# Patient Record
Sex: MALE | Race: BLACK OR AFRICAN AMERICAN | NOT HISPANIC OR LATINO | URBAN - METROPOLITAN AREA
[De-identification: names, ages, dates, MRNs, and addresses within clinical notes are randomized per-mention and may not be internally consistent; named-entity substitution may affect disease eponyms.]

---

## 2018-03-26 ENCOUNTER — EMERGENCY (EMERGENCY)
Facility: HOSPITAL | Age: 58
LOS: 1 days | Discharge: HOME | End: 2018-03-26
Attending: EMERGENCY MEDICINE | Admitting: EMERGENCY MEDICINE
Payer: COMMERCIAL

## 2018-03-26 VITALS
WEIGHT: 132.28 LBS | OXYGEN SATURATION: 93 % | TEMPERATURE: 97 F | RESPIRATION RATE: 22 BRPM | HEIGHT: 68 IN | DIASTOLIC BLOOD PRESSURE: 84 MMHG | HEART RATE: 106 BPM | SYSTOLIC BLOOD PRESSURE: 136 MMHG

## 2018-03-26 VITALS
DIASTOLIC BLOOD PRESSURE: 63 MMHG | TEMPERATURE: 98 F | SYSTOLIC BLOOD PRESSURE: 140 MMHG | RESPIRATION RATE: 20 BRPM | OXYGEN SATURATION: 93 % | HEART RATE: 97 BPM

## 2018-03-26 DIAGNOSIS — Z79.899 OTHER LONG TERM (CURRENT) DRUG THERAPY: ICD-10-CM

## 2018-03-26 DIAGNOSIS — R07.89 OTHER CHEST PAIN: ICD-10-CM

## 2018-03-26 LAB
ALBUMIN SERPL ELPH-MCNC: 4 G/DL — SIGNIFICANT CHANGE UP (ref 3.3–5)
ALP SERPL-CCNC: 77 U/L — SIGNIFICANT CHANGE UP (ref 40–120)
ALT FLD-CCNC: 27 U/L — SIGNIFICANT CHANGE UP (ref 10–45)
ANION GAP SERPL CALC-SCNC: 6 MMOL/L — SIGNIFICANT CHANGE UP (ref 5–17)
APPEARANCE UR: CLEAR — SIGNIFICANT CHANGE UP
APTT BLD: 31.6 SEC — SIGNIFICANT CHANGE UP (ref 27.5–37.4)
AST SERPL-CCNC: 29 U/L — SIGNIFICANT CHANGE UP (ref 10–40)
BASOPHILS NFR BLD AUTO: 0.4 % — SIGNIFICANT CHANGE UP (ref 0–2)
BILIRUB SERPL-MCNC: 0.3 MG/DL — SIGNIFICANT CHANGE UP (ref 0.2–1.2)
BILIRUB UR-MCNC: NEGATIVE — SIGNIFICANT CHANGE UP
BUN SERPL-MCNC: 14 MG/DL — SIGNIFICANT CHANGE UP (ref 7–23)
CALCIUM SERPL-MCNC: 9.5 MG/DL — SIGNIFICANT CHANGE UP (ref 8.4–10.5)
CHLORIDE SERPL-SCNC: 102 MMOL/L — SIGNIFICANT CHANGE UP (ref 96–108)
CK MB CFR SERPL CALC: 3.8 NG/ML — SIGNIFICANT CHANGE UP (ref 0–6.7)
CK SERPL-CCNC: 116 U/L — SIGNIFICANT CHANGE UP (ref 30–200)
CO2 SERPL-SCNC: 35 MMOL/L — HIGH (ref 22–31)
COLOR SPEC: YELLOW — SIGNIFICANT CHANGE UP
CREAT SERPL-MCNC: 1.2 MG/DL — SIGNIFICANT CHANGE UP (ref 0.5–1.3)
DIFF PNL FLD: NEGATIVE — SIGNIFICANT CHANGE UP
EOSINOPHIL NFR BLD AUTO: 4.2 % — SIGNIFICANT CHANGE UP (ref 0–6)
GLUCOSE SERPL-MCNC: 67 MG/DL — LOW (ref 70–99)
GLUCOSE UR QL: NEGATIVE — SIGNIFICANT CHANGE UP
HCT VFR BLD CALC: 43.5 % — SIGNIFICANT CHANGE UP (ref 39–50)
HGB BLD-MCNC: 14.4 G/DL — SIGNIFICANT CHANGE UP (ref 13–17)
HIV 1+2 AB+HIV1 P24 AG SERPL QL IA: SIGNIFICANT CHANGE UP
INR BLD: 0.97 — SIGNIFICANT CHANGE UP (ref 0.88–1.16)
KETONES UR-MCNC: NEGATIVE — SIGNIFICANT CHANGE UP
LEUKOCYTE ESTERASE UR-ACNC: NEGATIVE — SIGNIFICANT CHANGE UP
LIDOCAIN IGE QN: 65 U/L — HIGH (ref 7–60)
LYMPHOCYTES # BLD AUTO: 14.2 % — SIGNIFICANT CHANGE UP (ref 13–44)
MAGNESIUM SERPL-MCNC: 2.2 MG/DL — SIGNIFICANT CHANGE UP (ref 1.6–2.6)
MCHC RBC-ENTMCNC: 29.1 PG — SIGNIFICANT CHANGE UP (ref 27–34)
MCHC RBC-ENTMCNC: 33.1 G/DL — SIGNIFICANT CHANGE UP (ref 32–36)
MCV RBC AUTO: 87.9 FL — SIGNIFICANT CHANGE UP (ref 80–100)
MONOCYTES NFR BLD AUTO: 13.6 % — SIGNIFICANT CHANGE UP (ref 2–14)
NEUTROPHILS NFR BLD AUTO: 67.6 % — SIGNIFICANT CHANGE UP (ref 43–77)
NITRITE UR-MCNC: NEGATIVE — SIGNIFICANT CHANGE UP
PH UR: 6 — SIGNIFICANT CHANGE UP (ref 5–8)
PLATELET # BLD AUTO: 213 K/UL — SIGNIFICANT CHANGE UP (ref 150–400)
POTASSIUM SERPL-MCNC: 4.3 MMOL/L — SIGNIFICANT CHANGE UP (ref 3.5–5.3)
POTASSIUM SERPL-SCNC: 4.3 MMOL/L — SIGNIFICANT CHANGE UP (ref 3.5–5.3)
PROT SERPL-MCNC: 7.8 G/DL — SIGNIFICANT CHANGE UP (ref 6–8.3)
PROT UR-MCNC: 30 MG/DL
PROTHROM AB SERPL-ACNC: 10.8 SEC — SIGNIFICANT CHANGE UP (ref 9.8–12.7)
RBC # BLD: 4.95 M/UL — SIGNIFICANT CHANGE UP (ref 4.2–5.8)
RBC # FLD: 14.5 % — SIGNIFICANT CHANGE UP (ref 10.3–16.9)
SODIUM SERPL-SCNC: 143 MMOL/L — SIGNIFICANT CHANGE UP (ref 135–145)
SP GR SPEC: 1.02 — SIGNIFICANT CHANGE UP (ref 1–1.03)
TROPONIN T SERPL-MCNC: <0.01 NG/ML — SIGNIFICANT CHANGE UP (ref 0–0.01)
UROBILINOGEN FLD QL: 0.2 E.U./DL — SIGNIFICANT CHANGE UP
WBC # BLD: 4.5 K/UL — SIGNIFICANT CHANGE UP (ref 3.8–10.5)
WBC # FLD AUTO: 4.5 K/UL — SIGNIFICANT CHANGE UP (ref 3.8–10.5)

## 2018-03-26 PROCEDURE — 82553 CREATINE MB FRACTION: CPT

## 2018-03-26 PROCEDURE — 96374 THER/PROPH/DIAG INJ IV PUSH: CPT

## 2018-03-26 PROCEDURE — 85025 COMPLETE CBC W/AUTO DIFF WBC: CPT

## 2018-03-26 PROCEDURE — 71046 X-RAY EXAM CHEST 2 VIEWS: CPT | Mod: 26

## 2018-03-26 PROCEDURE — 74177 CT ABD & PELVIS W/CONTRAST: CPT | Mod: 26

## 2018-03-26 PROCEDURE — 85610 PROTHROMBIN TIME: CPT

## 2018-03-26 PROCEDURE — 74177 CT ABD & PELVIS W/CONTRAST: CPT

## 2018-03-26 PROCEDURE — 82550 ASSAY OF CK (CPK): CPT

## 2018-03-26 PROCEDURE — 81001 URINALYSIS AUTO W/SCOPE: CPT

## 2018-03-26 PROCEDURE — 80053 COMPREHEN METABOLIC PANEL: CPT

## 2018-03-26 PROCEDURE — 82962 GLUCOSE BLOOD TEST: CPT

## 2018-03-26 PROCEDURE — 83690 ASSAY OF LIPASE: CPT

## 2018-03-26 PROCEDURE — 71260 CT THORAX DX C+: CPT | Mod: 26

## 2018-03-26 PROCEDURE — 87389 HIV-1 AG W/HIV-1&-2 AB AG IA: CPT

## 2018-03-26 PROCEDURE — 99284 EMERGENCY DEPT VISIT MOD MDM: CPT | Mod: 25

## 2018-03-26 PROCEDURE — 83735 ASSAY OF MAGNESIUM: CPT

## 2018-03-26 PROCEDURE — 84484 ASSAY OF TROPONIN QUANT: CPT

## 2018-03-26 PROCEDURE — 85730 THROMBOPLASTIN TIME PARTIAL: CPT

## 2018-03-26 PROCEDURE — 71260 CT THORAX DX C+: CPT

## 2018-03-26 PROCEDURE — 36415 COLL VENOUS BLD VENIPUNCTURE: CPT

## 2018-03-26 PROCEDURE — 71046 X-RAY EXAM CHEST 2 VIEWS: CPT

## 2018-03-26 PROCEDURE — 93005 ELECTROCARDIOGRAM TRACING: CPT

## 2018-03-26 PROCEDURE — 99285 EMERGENCY DEPT VISIT HI MDM: CPT | Mod: 25

## 2018-03-26 PROCEDURE — 93010 ELECTROCARDIOGRAM REPORT: CPT

## 2018-03-26 RX ORDER — FAMOTIDINE 10 MG/ML
20 INJECTION INTRAVENOUS ONCE
Qty: 0 | Refills: 0 | Status: COMPLETED | OUTPATIENT
Start: 2018-03-26 | End: 2018-03-26

## 2018-03-26 RX ORDER — IOHEXOL 300 MG/ML
50 INJECTION, SOLUTION INTRAVENOUS ONCE
Qty: 0 | Refills: 0 | Status: COMPLETED | OUTPATIENT
Start: 2018-03-26 | End: 2018-03-26

## 2018-03-26 RX ORDER — SODIUM CHLORIDE 9 MG/ML
1000 INJECTION INTRAMUSCULAR; INTRAVENOUS; SUBCUTANEOUS ONCE
Qty: 0 | Refills: 0 | Status: COMPLETED | OUTPATIENT
Start: 2018-03-26 | End: 2018-03-26

## 2018-03-26 RX ADMIN — Medication 30 MILLILITER(S): at 14:15

## 2018-03-26 RX ADMIN — SODIUM CHLORIDE 2000 MILLILITER(S): 9 INJECTION INTRAMUSCULAR; INTRAVENOUS; SUBCUTANEOUS at 14:15

## 2018-03-26 RX ADMIN — IOHEXOL 50 MILLILITER(S): 300 INJECTION, SOLUTION INTRAVENOUS at 14:14

## 2018-03-26 RX ADMIN — FAMOTIDINE 20 MILLIGRAM(S): 10 INJECTION INTRAVENOUS at 14:15

## 2018-03-26 NOTE — ED PROVIDER NOTE - ATTENDING CONTRIBUTION TO CARE
57M hx Sarcoidosis on a prednisone taper here w/ epigastric/chest pain x years, has been worked up with nothing found. pain is 10/10, with some SOB but unsure if from sarcoid or not, 57M hx Sarcoidosis on a prednisone taper here w/ epigastric/chest pain x years, has been worked up with nothing found as per patient. pain is 10/10, with some SOB but unsure if from sarcoid or not, trouble ambulating more than a few feet. +productive cough, no fevers/chills. no other GI symptoms. no pedal edema or leg pain. on exam pt w/ mild tachycardia, lungs clear on exam, mild tenderness below the ribs and epigastric area. will check ct to eval, if neg anticipate outpatient workup

## 2018-03-26 NOTE — ED PROVIDER NOTE - MEDICAL DECISION MAKING DETAILS
Etiology of Pain Unclear. Unclear if Thoracic or Abdominal Etiology. Patient with Pulmonary Sarcoidosis, and Pain Beneath Ribs. Will Obtain CXR and CT Chest with IV Contrast. Patient Currently Being Treated for Sarcoidosis Flare on Steroid Taper. No Signs or Symptoms of Respiratory Infection, and Low Suspicion for PE. SOB Likely Related to Sarcoidosis. Some of Pain in Epigastrum, Possible Gastritis or PUD in Setting of Steroid Use. Will Try Pepcid and Maalox. Will Obtain CT Abdomen/Pelvis as Well. Will Check CBC, CMP, Mg, Cardiac Enzymes, Lipase. Will Given 1L Normal Saline. Will Reasses.

## 2018-03-26 NOTE — ED PROVIDER NOTE - CHPI ED SYMPTOMS NEG
no nausea/no vomiting/no diaphoresis/no dizziness/no fever/no back pain/no syncope/no chest pain/no chills

## 2018-03-26 NOTE — ED PROVIDER NOTE - PHYSICAL EXAMINATION
General: Mild Distress 2/2 Pain, Non-Toxic, Frail  HEENT: NCAT, PERRLA B/L, EOMI B/L, MMM  Neck: Supple, No Cervical or Supraclavicular Lymphadenopathy  Heart: Normal S1+S2, RRR, No M/R/G  Lungs: CTA B/L, No W/R/R  Chest: + Tenderness to Palpation, Subcostal Area Bilaterally  Abdomen: Soft, Tenderness to Palpation Epigastric Area, Non-Distended, Normoactive Bowel Sounds, No Guarding, No Rebound Tenderness, No Rigidity  Extremities: Warm, Well Perfused, 2+ Radial and DP Pulses Bilaterally, No Edema  Skin: Warm, Dry

## 2018-03-26 NOTE — ED PROVIDER NOTE - OBJECTIVE STATEMENT
Patient is a 57 year old male with past medical history of Sarcoidosis presenting with Bilateral Subcostal Pain.     Patient reports Pain Started "Years Ago". Patient reports Pain is Located Below Ribs and in Epigastric Area, 10/10 in Severity, Constant, No Alleviating Factors, and Worsened with Movement, Deep Breath, and Not Eating For Prolonged Periods. Patient reports Work-Up in Past with No Known Etiology of Pain. Patient denies Associated Palpitations, Dizziness/Lightheadedness, but reports SOB. Patient reports Difficult To Ambulate Few Feet, and Not Sure if SOB or if Difficulty Breathing is Secondary to Pain. Patient reports Associated Cough, which is Intermittently Productive of Clear/White Sputum. Patient denies Diaphoresis, Nausea, Vomiting. Patient denies Fever, Chills, Sinus Congestion, Rhinorrhea, Other Abdominal Pain, Diarrhea, Constipation, Melena, Hematochezia, Dysuria, Hematuria, Pain/Swelling of Lower Extremities.     Patient denies Recent Travel, Surgery, or Prolonged Mobilization. Patient denies Family or Personal History of Blood Clots. Patient is a 57 year old male with past medical history of Sarcoidosis presenting with Bilateral Subcostal Pain.     Patient reports Pain Started "Years Ago". Patient reports Pain is Located Below Ribs and in Epigastric Area, 10/10 in Severity, Constant, No Alleviating Factors, and Worsened with Movement, Deep Breath, and Not Eating For Prolonged Periods. Patient reports Work-Up in Past with No Known Etiology of Pain. Patient denies Associated Palpitations, Dizziness/Lightheadedness, but reports SOB. Patient reports Difficult To Ambulate Few Feet, and Not Sure if SOB or if Difficulty Breathing is Secondary to Pain. Patient reports Worsening of Pain and SOB starting about 6 Months Ago, and has Remained The Same Since, but Can't Handle It Anymore and Wants Answer. Patient reports Associated Cough, which is Intermittently Productive of Clear/White Sputum. Patient denies Diaphoresis, Nausea, Vomiting. Patient denies Fever, Chills, Sinus Congestion, Rhinorrhea, Other Abdominal Pain, Diarrhea, Constipation, Melena, Hematochezia, Dysuria, Hematuria, Pain/Swelling of Lower Extremities.     Patient denies Recent Travel, Surgery, or Prolonged Mobilization. Patient denies Family or Personal History of Blood Clots.

## 2018-03-26 NOTE — ED ADULT NURSE NOTE - OBJECTIVE STATEMENT
Patient is a 56yo male reporting months of constant 10/10 upper abdominal pain. Denies chest pain, vomiting, nausea, constipation, diarrhea, urinary symptoms.

## 2023-02-28 NOTE — ED ADULT NURSE NOTE - FALL HARM RISK TYPE OF ASSESSMENT
Spoke with the patient to let him know we needed to move his procedure time/date due to Doctor having clinic on current scheduled procedure date. He verbalized understanding and will be here. [facility]   Date: 3/2/2023  Arrival time: 8:30 am  Procedure time: 10:00 am     Taylor updated / alerted cath lab changes okay. Admission